# Patient Record
Sex: MALE | Race: WHITE | NOT HISPANIC OR LATINO | Employment: UNEMPLOYED | URBAN - METROPOLITAN AREA
[De-identification: names, ages, dates, MRNs, and addresses within clinical notes are randomized per-mention and may not be internally consistent; named-entity substitution may affect disease eponyms.]

---

## 2017-03-30 ENCOUNTER — HOSPITAL ENCOUNTER (EMERGENCY)
Facility: HOSPITAL | Age: 32
Discharge: HOME | End: 2017-03-31
Attending: EMERGENCY MEDICINE
Payer: MEDICAID

## 2017-03-30 DIAGNOSIS — E87.1 HYPONATREMIA: Primary | ICD-10-CM

## 2017-03-30 DIAGNOSIS — F10.20 ALCOHOLISM: ICD-10-CM

## 2017-03-30 LAB — ETHANOL SERPL-MCNC: 239 MG/DL

## 2017-03-30 PROCEDURE — 96360 HYDRATION IV INFUSION INIT: CPT

## 2017-03-30 PROCEDURE — 80053 COMPREHEN METABOLIC PANEL: CPT

## 2017-03-30 PROCEDURE — 85025 COMPLETE CBC W/AUTO DIFF WBC: CPT

## 2017-03-30 PROCEDURE — 25000003 PHARM REV CODE 250: Performed by: EMERGENCY MEDICINE

## 2017-03-30 PROCEDURE — 84443 ASSAY THYROID STIM HORMONE: CPT

## 2017-03-30 PROCEDURE — 99283 EMERGENCY DEPT VISIT LOW MDM: CPT | Mod: 25

## 2017-03-30 PROCEDURE — 80320 DRUG SCREEN QUANTALCOHOLS: CPT

## 2017-03-30 RX ORDER — THIAMINE HCL 100 MG
100 TABLET ORAL
Status: COMPLETED | OUTPATIENT
Start: 2017-03-30 | End: 2017-03-30

## 2017-03-30 RX ORDER — CITALOPRAM 20 MG/1
20 TABLET, FILM COATED ORAL DAILY
COMMUNITY

## 2017-03-30 RX ORDER — FOLIC ACID 1 MG/1
2 TABLET ORAL
Status: COMPLETED | OUTPATIENT
Start: 2017-03-30 | End: 2017-03-30

## 2017-03-30 RX ORDER — LISINOPRIL 20 MG/1
20 TABLET ORAL DAILY
COMMUNITY

## 2017-03-30 RX ADMIN — FOLIC ACID 2 MG: 1 TABLET ORAL at 11:03

## 2017-03-30 RX ADMIN — Medication 100 MG: at 11:03

## 2017-03-30 NOTE — ED AVS SNAPSHOT
[facility]  [address]  [facility] [address]               Simón Lowe JrKervin   3/30/2017 10:30 PM   ED    Description:  Male : 1985   Department:  [facility]           Your Care was Coordinated By:     Provider Role From To    Iglesia Kincaid MD Attending Provider [id number] --      Reason for Visit     Fatigue           Diagnoses this Visit        Comments    Hyponatremia    -  Primary     Alcoholism           ED Disposition     None           To Do List           Follow-up Information     Call [facility].    Specialty:  Family Medicine    Why:  Call to make an appointment with your regular doctor if you don't have one at this time.  Otherwise, follow up with your regular doctor in the next week for repeat blood work.  Your sodium level today is 130 and is from drinking too much beer.     Contact information:    [address], [address]  [facility] [address] [phone]        Follow up with [facility].    Specialties:  Behavioral Health, Psychiatric Facility    Why:  Call to schedule an appointment for inpatient or outpatient alcohol detoxification.     Contact information:    [address]  [phone]        [facility]     [facility] -  Assistance  Unless otherwise directed by your provider, please contact [facility], our nurse care line that is available for  assistance.     Registered nurses in the [facility] provide: appointment scheduling, clinical advisement, health education, and other advisory services.  Call: [phone] (toll free)               Medications           Message regarding Medications     Verify the changes and/or additions to your medication regime listed below are the same as discussed with your clinician today.  If any of these changes or additions are incorrect,  "please notify your healthcare provider.        These medications were administered today        Dose Freq    thiamine tablet 100 mg 100 mg ED 1 Time    Sig: Take 1 tablet (100 mg total) by mouth ED 1 Time.    Class: Normal    Route: Oral    folic acid tablet 2 mg 2 mg ED 1 Time    Sig: Take 2 tablets (2 mg total) by mouth ED 1 Time.    Class: Normal    Route: Oral    sodium chloride 0.9% bolus 1,000 mL 1,000 mL ED 1 Time    Sig: Inject 1,000 mLs into the vein ED 1 Time.    Class: Normal    Route: Intravenous           Verify that the below list of medications is an accurate representation of the medications you are currently taking.  If none reported, the list may be blank. If incorrect, please contact your healthcare provider. Carry this list with you in case of emergency.           Current Medications     citalopram (CELEXA) 20 MG tablet Take 20 mg by mouth once daily.    levetiracetam (KEPPRA) 1000 MG tablet Take 1 tablet (1,000 mg total) by mouth 2 (two) times daily.    lisinopril (PRINIVIL,ZESTRIL) 20 MG tablet Take 20 mg by mouth once daily.    hydrocodone-acetaminophen 5-325mg (NORCO) 5-325 mg per tablet Take 1 tablet by mouth every 4 (four) hours as needed for Pain.    sodium chloride 0.9% bolus 1,000 mL Inject 1,000 mLs into the vein ED 1 Time.           Clinical Reference Information           Your Vitals Were     BP Pulse Temp Resp Height Weight    130/87 (BP Location: Right arm, Patient Position: Sitting) 105 98 °F (36.7 °C) (Oral) 20 5' 6" (1.676 m) 75.8 kg (167 lb)    SpO2 BMI             98% 26.95 kg/m2         Allergies as of 3/31/2017     No Known Allergies      Immunizations Administered on Date of Encounter - 3/31/2017     None      ED Micro, Lab, POCT     Start Ordered       Status Ordering Provider    03/30/17 2303 03/30/17 2302  Ethanol  STAT      Final result     03/30/17 2300 03/30/17 2259  Levetiracetam level  STAT      In process     03/30/17 2256 03/30/17 2256  CBC auto differential  STAT  "     In process     03/30/17 2256 03/30/17 2256  Comprehensive metabolic panel  STAT      Final result     03/30/17 2256 03/30/17 2256  TSH  STAT      In process       ED Imaging Orders     None      Discharge References/Attachments     ALCOHOLISM, THE IMPACT OF (ENGLISH)    ALCOHOLISM, UNDERSTANDING (ENGLISH)    ALCOHOLISM: GETTING HELP (ENGLISH)    ALCOHOLISM: HOW TO BE PART OF THE SOLUTION (ENGLISH)    ALCOHOLISM: RESOURCES FOR FAMILY AND FRIENDS (ENGLISH)    HYPONATREMIA, DISCHARGE INSTRUCTIONS (ENGLISH)    HYPONATREMIA (ENGLISH)      [facility] Sign-Up     Activating your [facility] account is as easy as 1-2-3!     1) Visit [email or web address], select Sign Up Now, enter this activation code and your date of birth, then select Next.  [email or web address]  Expires: 5/15/2017 12:17 AM      2) Create a username and password to use when you visit [facility] in the future and select a security question in case you lose your password and select Next.    3) Enter your e-mail address and click Sign Up!    Additional Information  If you have questions, please e-mail [email or web address] or call [phone] to talk to our [facility] staff. Remember, [facility] is NOT to be used for urgent needs. For medical emergencies, dial 911.         Smoking Cessation     If you would like to quit smoking:   You may be eligible for free services if you are a [place] resident and started smoking cigarettes before September 1, 1988.  Call the Smoking Cessation Trust ([facility]) toll free at (457) 666-2238 or (747) 208-6370.   Call 9-800-QUIT-NOW if you do not meet the above criteria.   Contact us via email: [email or web address]   View our website for more information: [email or web address]         [facility] complies with applicable Federal civil rights laws and does not discriminate on the basis of race, color, national origin, age, disability, or sex.        Language Assistance Services     ATTENTION: Language  assistance services are available, free of charge. Please call [phone].      ATENCIÓN: Si habla español, tiene a nguyen disposición servicios gratuitos de asistencia lingüística. Llame al [phone].     CHÚ Ý: N?u b?n nói Ti?ng Vi?t, có các d?ch v? h? tr? ngôn ng? mi?n phí dành cho b?n. G?i s? [phone].

## 2017-03-31 VITALS
SYSTOLIC BLOOD PRESSURE: 130 MMHG | BODY MASS INDEX: 26.84 KG/M2 | HEIGHT: 66 IN | OXYGEN SATURATION: 98 % | RESPIRATION RATE: 18 BRPM | TEMPERATURE: 98 F | HEART RATE: 80 BPM | WEIGHT: 167 LBS | DIASTOLIC BLOOD PRESSURE: 67 MMHG

## 2017-03-31 LAB
ALBUMIN SERPL BCP-MCNC: 4.3 G/DL
ALP SERPL-CCNC: 55 U/L
ALT SERPL W/O P-5'-P-CCNC: 23 U/L
ANION GAP SERPL CALC-SCNC: 15 MMOL/L
AST SERPL-CCNC: 24 U/L
BASOPHILS # BLD AUTO: 0.04 K/UL
BASOPHILS NFR BLD: 0.6 %
BILIRUB SERPL-MCNC: 0.3 MG/DL
BUN SERPL-MCNC: 5 MG/DL
CALCIUM SERPL-MCNC: 8.9 MG/DL
CHLORIDE SERPL-SCNC: 97 MMOL/L
CO2 SERPL-SCNC: 18 MMOL/L
CREAT SERPL-MCNC: 0.7 MG/DL
DIFFERENTIAL METHOD: ABNORMAL
EOSINOPHIL # BLD AUTO: 0.2 K/UL
EOSINOPHIL NFR BLD: 3.4 %
ERYTHROCYTE [DISTWIDTH] IN BLOOD BY AUTOMATED COUNT: 13.4 %
EST. GFR  (AFRICAN AMERICAN): >60 ML/MIN/1.73 M^2
EST. GFR  (NON AFRICAN AMERICAN): >60 ML/MIN/1.73 M^2
GLUCOSE SERPL-MCNC: 87 MG/DL
HCT VFR BLD AUTO: 42.6 %
HGB BLD-MCNC: 14.9 G/DL
LYMPHOCYTES # BLD AUTO: 2.5 K/UL
LYMPHOCYTES NFR BLD: 36.8 %
MCH RBC QN AUTO: 33.5 PG
MCHC RBC AUTO-ENTMCNC: 35 %
MCV RBC AUTO: 96 FL
MONOCYTES # BLD AUTO: 0.9 K/UL
MONOCYTES NFR BLD: 12.7 %
NEUTROPHILS # BLD AUTO: 3.1 K/UL
NEUTROPHILS NFR BLD: 46.4 %
PLATELET # BLD AUTO: 274 K/UL
PMV BLD AUTO: 9.5 FL
POTASSIUM SERPL-SCNC: 4.4 MMOL/L
PROT SERPL-MCNC: 7.8 G/DL
RBC # BLD AUTO: 4.45 M/UL
SODIUM SERPL-SCNC: 130 MMOL/L
TSH SERPL DL<=0.005 MIU/L-ACNC: 1.42 UIU/ML
WBC # BLD AUTO: 6.69 K/UL

## 2017-03-31 PROCEDURE — 25000003 PHARM REV CODE 250: Performed by: EMERGENCY MEDICINE

## 2017-03-31 RX ADMIN — SODIUM CHLORIDE 1000 ML: 0.9 INJECTION, SOLUTION INTRAVENOUS at 12:03

## 2017-03-31 NOTE — ED TRIAGE NOTES
Pt. To room 6 with c/o generalized weakness for 2 days, pt. Is awake, alert and oriented. Skin PWD. Pt. Reports of having a history of HTN and seizures. Pt. smokes a pack of cigarettes a day. He is compliant with his home BP and seizure medications, he hasn't taken his vitamins in a few months. Spouse at bedside. Pt. ETOH

## 2017-03-31 NOTE — ED PROVIDER NOTES
Encounter Date: 3/30/2017       History     Chief Complaint   Patient presents with    Fatigue     reports extreme fatigue for 2 days      Review of patient's allergies indicates:  No Known Allergies  HPI Comments: Patient is a 31-year-old male with a past medical history of hypertension and alcohol withdrawal seizures who presents to emergency room for evaluation of extreme fatigue ×2 days.  The patient states he feels slightly dizzy when he stands up and walks around.  He denies any significant headache diplopia blurred vision slurred speech unilateral focal weakness or numbness trauma black or bloody stools abdominal pain polyuria or polydipsia.  Patient drinks about 10-15 beers a day for the past year and a half and prior to that drink or liquor.  He doesn't have a job.  He's never been to rehabilitation.  He states he's missed his dose of Celexa for the past 3 days for his anxiety but has been compliant with his Keppra and his lisinopril.  He denies any significant depression, but would like to stop drinking at some point.  He admits to drinking 10-12 beers tonight.  He typically has 10-15 beers a day.     Past Medical History:   Diagnosis Date    Hypertension     Seizures      History reviewed. No pertinent surgical history.  History reviewed. No pertinent family history.  Social History   Substance Use Topics    Smoking status: Smoker, Current Status Unknown     Packs/day: 1.00     Types: Cigarettes    Smokeless tobacco: Never Used    Alcohol use 4.8 oz/week     8 Cans of beer per week      Comment: 8 beers daily     Review of Systems   Constitutional: Positive for fatigue. Negative for chills, diaphoresis and fever.   HENT: Negative for ear pain, rhinorrhea and sore throat.    Eyes: Negative for pain and visual disturbance.   Respiratory: Negative for cough and shortness of breath.    Cardiovascular: Negative for chest pain.   Gastrointestinal: Negative for abdominal pain, diarrhea, nausea and  vomiting.   Genitourinary: Negative for decreased urine volume, difficulty urinating, dysuria and flank pain.   Musculoskeletal: Negative for arthralgias, back pain and myalgias.   Skin: Negative for color change, pallor and rash.   Neurological: Positive for dizziness and light-headedness. Negative for tremors, syncope, weakness, numbness and headaches.   Psychiatric/Behavioral: Negative for agitation, confusion and suicidal ideas. The patient is not nervous/anxious.    All other systems reviewed and are negative.      Physical Exam   Initial Vitals   BP Pulse Resp Temp SpO2   03/30/17 2225 03/30/17 2225 03/30/17 2225 03/30/17 2225 03/30/17 2225   130/87 105 20 98 °F (36.7 °C) 98 %     Physical Exam    Nursing note and vitals reviewed.  Constitutional: He appears well-developed and well-nourished. No distress.   Smells of EtOH     HENT:   Head: Normocephalic and atraumatic.   Mouth/Throat: Oropharynx is clear and moist.   Eyes: Conjunctivae and EOM are normal. Pupils are equal, round, and reactive to light.   Mild bilateral horizontal nystagmus   Neck: Normal range of motion. Neck supple. No JVD present.   Cardiovascular: Normal rate, regular rhythm, normal heart sounds and intact distal pulses. Exam reveals no gallop and no friction rub.    No murmur heard.  Pulmonary/Chest: Breath sounds normal. He has no wheezes. He has no rales.   Abdominal: Soft. Bowel sounds are normal. There is no tenderness.   Musculoskeletal: Normal range of motion. He exhibits no tenderness.   Neurological: He is alert and oriented to person, place, and time. He has normal strength. No sensory deficit.   Finger to nose and heel to shin surprisingly are intact.  He doesn't appear to have an abnormal gait.   Skin: Skin is warm and dry.   Psychiatric: He has a normal mood and affect.   Smells of EtOH but doesn't appear to be significantly intoxicated.         ED Course   Procedures  Labs Reviewed   CBC W/ AUTO DIFFERENTIAL   COMPREHENSIVE  METABOLIC PANEL   TSH             Medical Decision Making:   Patient likely has be reported jen with hyponatremia.  He was given thiamine folate and 1 normal saline bolus.  I don't think his condition develop osmotic demyelinating syndrome with a sodium of 1:30.  I've instructed him to call [facility] to begin outpatient rehabilitation and feel that he is stable for discharge.                   ED Course     Clinical Impression:   The primary encounter diagnosis was Hyponatremia. A diagnosis of Alcoholism was also pertinent to this visit.          Iglesia Kincaid MD  03/31/17 0019

## 2017-08-28 ENCOUNTER — HOSPITAL ENCOUNTER (EMERGENCY)
Facility: HOSPITAL | Age: 32
Discharge: HOME | End: 2017-08-28
Attending: EMERGENCY MEDICINE
Payer: MEDICAID

## 2017-08-28 VITALS
HEIGHT: 67 IN | OXYGEN SATURATION: 98 % | RESPIRATION RATE: 18 BRPM | TEMPERATURE: 98 F | WEIGHT: 142 LBS | HEART RATE: 100 BPM | SYSTOLIC BLOOD PRESSURE: 113 MMHG | DIASTOLIC BLOOD PRESSURE: 80 MMHG | BODY MASS INDEX: 22.29 KG/M2

## 2017-08-28 DIAGNOSIS — F10.20 ALCOHOLISM: Primary | ICD-10-CM

## 2017-08-28 LAB
ALBUMIN SERPL BCP-MCNC: 4 G/DL
ALP SERPL-CCNC: 65 U/L
ALT SERPL W/O P-5'-P-CCNC: 44 U/L
AMPHET+METHAMPHET UR QL: NEGATIVE
ANION GAP SERPL CALC-SCNC: 12 MMOL/L
AST SERPL-CCNC: 46 U/L
BARBITURATES UR QL SCN>200 NG/ML: NEGATIVE
BASOPHILS # BLD AUTO: 0.03 K/UL
BASOPHILS NFR BLD: 0.6 %
BENZODIAZ UR QL SCN>200 NG/ML: NEGATIVE
BILIRUB SERPL-MCNC: 0.2 MG/DL
BILIRUB UR QL STRIP: NEGATIVE
BUN SERPL-MCNC: 4 MG/DL
BZE UR QL SCN: NEGATIVE
CALCIUM SERPL-MCNC: 8.9 MG/DL
CANNABINOIDS UR QL SCN: NEGATIVE
CHLORIDE SERPL-SCNC: 105 MMOL/L
CLARITY UR: CLEAR
CO2 SERPL-SCNC: 25 MMOL/L
COLOR UR: YELLOW
CREAT SERPL-MCNC: 0.8 MG/DL
CREAT UR-MCNC: 8.4 MG/DL
DIFFERENTIAL METHOD: ABNORMAL
EOSINOPHIL # BLD AUTO: 0.1 K/UL
EOSINOPHIL NFR BLD: 2.1 %
ERYTHROCYTE [DISTWIDTH] IN BLOOD BY AUTOMATED COUNT: 13.7 %
EST. GFR  (AFRICAN AMERICAN): >60 ML/MIN/1.73 M^2
EST. GFR  (NON AFRICAN AMERICAN): >60 ML/MIN/1.73 M^2
ETHANOL SERPL-MCNC: 251 MG/DL
GLUCOSE SERPL-MCNC: 113 MG/DL
GLUCOSE UR QL STRIP: ABNORMAL
HCT VFR BLD AUTO: 41.6 %
HGB BLD-MCNC: 14.7 G/DL
HGB UR QL STRIP: NEGATIVE
KETONES UR QL STRIP: NEGATIVE
LEUKOCYTE ESTERASE UR QL STRIP: NEGATIVE
LYMPHOCYTES # BLD AUTO: 2.6 K/UL
LYMPHOCYTES NFR BLD: 50 %
MCH RBC QN AUTO: 33.6 PG
MCHC RBC AUTO-ENTMCNC: 35.3 G/DL
MCV RBC AUTO: 95 FL
METHADONE UR QL SCN>300 NG/ML: NEGATIVE
MONOCYTES # BLD AUTO: 0.8 K/UL
MONOCYTES NFR BLD: 14.3 %
NEUTROPHILS # BLD AUTO: 1.7 K/UL
NEUTROPHILS NFR BLD: 33 %
NITRITE UR QL STRIP: NEGATIVE
OPIATES UR QL SCN: NEGATIVE
PCP UR QL SCN>25 NG/ML: NEGATIVE
PH UR STRIP: 5 [PH] (ref 5–8)
PLATELET # BLD AUTO: 270 K/UL
PMV BLD AUTO: 8.6 FL
POTASSIUM SERPL-SCNC: 4.2 MMOL/L
PROT SERPL-MCNC: 7.6 G/DL
PROT UR QL STRIP: NEGATIVE
RBC # BLD AUTO: 4.37 M/UL
SODIUM SERPL-SCNC: 142 MMOL/L
SP GR UR STRIP: <=1.005 (ref 1–1.03)
TOXICOLOGY INFORMATION: ABNORMAL
URN SPEC COLLECT METH UR: ABNORMAL
UROBILINOGEN UR STRIP-ACNC: NEGATIVE EU/DL
WBC # BLD AUTO: 5.26 K/UL

## 2017-08-28 PROCEDURE — 99284 EMERGENCY DEPT VISIT MOD MDM: CPT

## 2017-08-28 PROCEDURE — 81003 URINALYSIS AUTO W/O SCOPE: CPT

## 2017-08-28 PROCEDURE — 80053 COMPREHEN METABOLIC PANEL: CPT

## 2017-08-28 PROCEDURE — 93005 ELECTROCARDIOGRAM TRACING: CPT

## 2017-08-28 PROCEDURE — 85025 COMPLETE CBC W/AUTO DIFF WBC: CPT

## 2017-08-28 PROCEDURE — 25000003 PHARM REV CODE 250: Performed by: PSYCHIATRY & NEUROLOGY

## 2017-08-28 PROCEDURE — 80307 DRUG TEST PRSMV CHEM ANLYZR: CPT

## 2017-08-28 PROCEDURE — 93010 ELECTROCARDIOGRAM REPORT: CPT | Mod: ,,, | Performed by: INTERNAL MEDICINE

## 2017-08-28 PROCEDURE — 80320 DRUG SCREEN QUANTALCOHOLS: CPT

## 2017-08-28 PROCEDURE — 25000003 PHARM REV CODE 250: Performed by: EMERGENCY MEDICINE

## 2017-08-28 RX ORDER — CHLORDIAZEPOXIDE HYDROCHLORIDE 25 MG/1
50 CAPSULE, GELATIN COATED ORAL
Status: COMPLETED | OUTPATIENT
Start: 2017-08-28 | End: 2017-08-28

## 2017-08-28 RX ORDER — THIAMINE HCL 100 MG
100 TABLET ORAL DAILY
Status: DISCONTINUED | OUTPATIENT
Start: 2017-08-28 | End: 2017-08-28 | Stop reason: HOSPADM

## 2017-08-28 RX ORDER — CHLORDIAZEPOXIDE HYDROCHLORIDE 25 MG/1
50 CAPSULE, GELATIN COATED ORAL 4 TIMES DAILY
Status: DISCONTINUED | OUTPATIENT
Start: 2017-08-28 | End: 2017-08-28 | Stop reason: HOSPADM

## 2017-08-28 RX ADMIN — Medication 1 TABLET: at 10:08

## 2017-08-28 RX ADMIN — CHLORDIAZEPOXIDE HYDROCHLORIDE 50 MG: 25 CAPSULE ORAL at 06:08

## 2017-08-28 RX ADMIN — Medication 100 MG: at 10:08

## 2017-08-28 RX ADMIN — CHLORDIAZEPOXIDE HYDROCHLORIDE 50 MG: 25 CAPSULE ORAL at 10:08

## 2017-08-28 NOTE — ED PROVIDER NOTES
Encounter Date: 8/28/2017       History     Chief Complaint   Patient presents with    Alcohol Problem     pt. is alcoholic and attempted to admit himself to [facility] for detox. pt. was sent here for medical clearance. pt. states he drank 13 beers today. average intake 18 beers per day.      31-year-old male long-standing alcoholic who presents to emergency department wanting detox.  He called [facility] and they told them he need to go to the ER for medical clearance.  The patient drinks approximately 12 beers to 18 beers a day.  His last beer was at midnight.  Patient states he wants to stop drinking because he hasn't worked in 2 years and and he would like to do it for his family and his wife.  At one point he did have a seizure several months ago, but denies any history of DTs.  He's been drinking daily for 5 years.  Currently he denies any headaches tremorsor any other complaints.          Review of patient's allergies indicates:  No Known Allergies  Past Medical History:   Diagnosis Date    Hypertension     Seizures      No past surgical history on file.  No family history on file.  Social History   Substance Use Topics    Smoking status: Smoker, Current Status Unknown     Packs/day: 1.00     Types: Cigarettes    Smokeless tobacco: Never Used    Alcohol use 4.8 oz/week     8 Cans of beer per week      Comment: 8 beers daily     Review of Systems   Constitutional: Negative for fever.   HENT: Negative for ear pain, rhinorrhea and sore throat.    Eyes: Negative for pain and visual disturbance.   Respiratory: Negative for cough and shortness of breath.    Cardiovascular: Negative for chest pain.   Gastrointestinal: Negative for abdominal pain, diarrhea, nausea and vomiting.   Genitourinary: Negative for difficulty urinating.   Musculoskeletal: Negative for arthralgias.   Skin: Negative for rash.   Neurological: Negative for weakness, numbness and headaches.   Psychiatric/Behavioral: Negative for agitation  and confusion.        Depressed but not suicidal   All other systems reviewed and are negative.      Physical Exam     Initial Vitals [08/28/17 0239]   BP Pulse Resp Temp SpO2   (!) 135/95 94 20 98.5 °F (36.9 °C) 97 %      MAP       108.33         Physical Exam    Nursing note and vitals reviewed.  Constitutional: He appears well-developed and well-nourished. No distress.   HENT:   Head: Normocephalic and atraumatic.   Right Ear: External ear normal.   Left Ear: External ear normal.   Nose: Nose normal.   Mouth/Throat: Oropharynx is clear and moist.   Eyes: Conjunctivae and EOM are normal. Pupils are equal, round, and reactive to light.   Neck: Normal range of motion. Neck supple.   Cardiovascular: Normal rate, regular rhythm, normal heart sounds and intact distal pulses. Exam reveals no gallop and no friction rub.    No murmur heard.  Pulmonary/Chest: Breath sounds normal. He has no wheezes. He has no rhonchi. He has no rales.   Abdominal: Soft. Bowel sounds are normal. There is no tenderness.   Musculoskeletal: Normal range of motion.   Neurological: He is alert and oriented to person, place, and time. He has normal strength. No sensory deficit.   Patient does not appear to be intoxicated and does not have any slurred speech.   Skin: Skin is warm and dry.   Psychiatric: He has a normal mood and affect.   Patient is not suicidal or homicidal.  He states he is not really depressed, but just wants to quit drinking.         ED Course   Procedures  Labs Reviewed   ALCOHOL,MEDICAL (ETHANOL) - Abnormal; Notable for the following:        Result Value    Alcohol, Medical, Serum 251 (*)     All other components within normal limits   DRUG SCREEN PANEL, URINE EMERGENCY - Abnormal; Notable for the following:     Creatinine, Random Ur 8.4 (*)     All other components within normal limits   COMPREHENSIVE METABOLIC PANEL - Abnormal; Notable for the following:     Glucose 113 (*)     BUN, Bld 4 (*)     AST 46 (*)     All other  components within normal limits   CBC W/ AUTO DIFFERENTIAL - Abnormal; Notable for the following:     RBC 4.37 (*)     MCH 33.6 (*)     MPV 8.6 (*)     Gran # 1.7 (*)     Gran% 33.0 (*)     Lymph% 50.0 (*)     All other components within normal limits   URINALYSIS             Medical Decision Making:   The patient is an alcoholic and would like to quit drinking.  He is not depressed and suicidal and does not meet PEC criteria.  He came here to get medically cleared at the request of [facility].  He has no signs of DTs here.  He does appear sober with an alcohol level of 251.  We will try to place the patient at a local hospital for formal voluntary admission.  I will give him a dose of Librium here now.  Medically cleared except his alcohol level is elevated at 251.  He does not appear to be clinically intoxicated and I suspect this is because of his chronic alcoholism.                   ED Course     Clinical Impression:   The encounter diagnosis was Alcoholism.                           Iglesia Kincaid MD  08/28/17 0674

## 2017-08-28 NOTE — ED NOTES
Spoke with admissions at [facility] will take pt with 200 etoh as long as he can stand up. Wife will drive pt to facility.  Patient  Awake andlert and oriented x 3, steady gait  Noted.

## 2017-08-28 NOTE — ED TRIAGE NOTES
Pt presents to ED with c/o needing alcohol detox after drinking 13 beers throughout the day. Pt states that he drinks an average of 18 beers a day and wants help to fix his drinking problem. Pt states that he attempted to go to [facility] for voluntary admission but was told that he needed to be medically cleared prior to being admitted. Pt is awake, alert and oriented. Significant other at bedside.

## 2017-08-28 NOTE — PROVIDER PROGRESS NOTES - EMERGENCY DEPT.
Encounter Date: 8/28/2017    ED Physician Progress Notes        Physician Note:   Case accepted from Dr. Kincaid at 7am pending psych eval by Dr. Wise, sobriety and disposition. Pt is a voluntary ETOH dependence patient who seeks to get clean. He is neuro intact (no tremors), and lungs, cardio exam is WNL. Pt denies any auditory hallucinations/sensitivity, formication, or anxiety at this time.    Dr. Wise states that patient can go to his facility, however the patient prefers [facility].     [facility] was contacted, who will accept the patient with EtOH well as it is.  Patient is clinically sober and stable for discharge at this time.  Patient will go voluntarily to [facility], who states they have a bed for him.     IMPRESSION:  1.Alcoholism  (primary encounter diagnosis)

## 2017-08-29 NOTE — PSYCH
"IDENTIFICATION DATA:  This is a 31-year-old single white male who was brought to   ER by significant other for alcohol intoxication and depression.  This consult   is requested by Dr. Kincaid for psych evaluation.  The patient is not on a PEC   status.    CHIEF COMPLAINT:  "I want to detox and able to function."    HISTORY OF PRESENTING ILLNESS:  According to ER notes, the patient came here to   get into [facility] and after medical clearance, the patient was drinking   alcohol.  The patient states that he has been drinking since his teens.  He is a   daily drinker and his sobriety time is not more than a few days.  He drinks   about 12 to 18 beers every day.  He gets shakes and has to drink in the morning   and able to function and avoid withdrawals.  He gets sweating, nausea and he has   a history of withdrawal seizures, but no DTs.  The patient never had DWIs.  The   patient states that it is affecting home life and motivation.  The patient   states that he does not feel depressed, but he was told that he has depression.    The patient states at times he feels hopeless and helpless.  He has no   motivation and low energy.  He is not eating properly and has no appetite.  He   admitted that he lost some weight.  He looks sad, but denies any sad feeling.    The patient denies use of other drugs.  He is not hearing voices or seeing   things.  He has no intention to harm self.  He has had no job for two years,   partly because of no motivation, staying at home and drinking.  It is affecting   his home life and his obligations.  He has trouble falling asleep and sometimes   he has to drink to fall asleep.    PAST PSYCHIATRIC HISTORY:  The patient has no previous in or outpatient   psychiatric treatment.  He was at [facility] for seizure activity two   years ago and they started him on antidepressants, which he did not take.  The   patient denies a history of suicide or homicide.  He has never been in rehab "   programs or meetings.  He has no history of arrest.    SOCIAL HISTORY:  The patient was born and raised in [place].  He   described his childhood as raised by mother and grandmother.  He has an 11th   grade education.  He was raised by mom, parents were  when he was born.    He has one seven-year-old daughter who lives with her mother.  His grandmother   and dad were alcoholics, but no suicide in family.    MEDICAL HISTORY:  The patient was diagnosed with seizure probably alcohol   withdrawal seizures and started on Keppra 1000.  He has hypertension and takes   lisinopril.  He has a history of GI bleed.  He was prescribed Celexa 20.    ALLERGIES:  He is not allergic to any medicine or food.    MEDICATIONS:  He is on lisinopril, there is some concern about question about   compliance issue.  The patient takes Keppra.  His CBC and chemistry are normal,   bilirubin is 0.2, AST 46, ALT 44.  Urine drug screen is negative.  His alcohol   level was 251.  Blood pressure is 135/95, with 94 pulse.  Librium 50 mg four   times a day, thiamine, multivitamins.    MENTAL STATUS EXAMINATION:  This is a 31-year-old healthy-looking white male who   is flushed, exhibited good eye contact.  He is alert, cooperative and oriented   to day, date, month and year.  Mood is depressed with sad affect.  Psychomotor   activity is decreased.  His speech is soft, clear, normal in amount, rate and   tone.  No loose association or disorganized thoughts noted.  Mood is depressed   with sad affect.  Psychomotor activity is decreased.  No tremors noted; however,   he still has flushed face.  He is able to recall 3 objects out of 3   immediately, 2 out of 3 after 5 minutes and 3 out of 3 with some assistance.  He   denies hearing voices or seeing things.  He has no intention to harm self or   others.  Insight and judgment are impaired.  He is an average intelligent   person.    PSYCHIATRIC DIAGNOSES:  AXIS I:  Depressive disorder,  not otherwise specified, alcohol use disorder,   alcohol intoxication severe without perceptual disturbance.  AXIS II:  No diagnosis.  AXIS III:  Hypertension, seizure disorder.  AXIS IV:  Medical problems, unemployed, unable to quit.  AXIS V:  35.    RECOMMENDATIONS:  We will start this patient on Librium 50 mg 4 times a day x1   day, then 25 mg 4 times a day.  We will use thiamine, multivitamins as per   order.  We will try [facility] for his transfer, otherwise, we will admit this   patient to [facility].  In [facility], his initial   diagnostic workup will also include nursing assessment,    assessment, laboratory workup and physical examination.  We will detox and   consider the option of Antabuse.  We will educate about medication, illness and   alcohol and relapse prevention techniques.    PROGNOSIS:  Fair.    ESTIMATED LENGTH OF STAY IN HOSPITAL: Would be 3 days.    CRITERIA FOR DISCHARGE:  The patient will show improvement in depression and   safe detox.    ASSETS:  The patient is verbal, medically stable and has a place to live.            / [id number] macario(s)       ODALIS  dd: 08/28/2017 10:17:26 (CDT)  td: 08/28/2017 22:06:52 (CDT)  Doc ID   #[id number]  Job ID #[id number]    CC: [facility]

## 2017-11-17 ENCOUNTER — HOSPITAL ENCOUNTER (EMERGENCY)
Facility: HOSPITAL | Age: 32
Discharge: HOME | End: 2017-11-17
Attending: EMERGENCY MEDICINE
Payer: MEDICAID

## 2017-11-17 VITALS
HEIGHT: 68 IN | HEART RATE: 90 BPM | RESPIRATION RATE: 18 BRPM | TEMPERATURE: 98 F | SYSTOLIC BLOOD PRESSURE: 123 MMHG | OXYGEN SATURATION: 98 % | BODY MASS INDEX: 25.91 KG/M2 | WEIGHT: 171 LBS | DIASTOLIC BLOOD PRESSURE: 84 MMHG

## 2017-11-17 DIAGNOSIS — R20.2 PARESTHESIA OF LEFT ARM: Primary | ICD-10-CM

## 2017-11-17 PROCEDURE — 99283 EMERGENCY DEPT VISIT LOW MDM: CPT

## 2017-11-18 NOTE — DISCHARGE INSTRUCTIONS
Return to the ED if condition changes, progresses, or if you have any concerns. Continue your medications as prescribed.

## 2017-11-18 NOTE — ED NOTES
Pt reports lt arm numbness woke him up this morning, pt reports sensation returned approx 1-2 minutes after waking up, pt denies recent injury or trauma, bilateral radial pulses + 2, no tremors noted, pt awake alert in no acute distress, pt denies chest pain or sob, full active rom noted, bilateral lower arm sensation intact, friend in room with pt,

## 2017-11-18 NOTE — ED PROVIDER NOTES
"Encounter Date: 11/17/2017       History     Chief Complaint   Patient presents with    Arm Pain     left upper arm pain in axillary area with weakness that has resolved, tingling sensation to left arm now, no limb drift or weakness noted, fine tremors noted     33yo male presents to the ED for evaluation of left arm "tingling."  Pt states that when he awoke today, he reports that his left arm was numb.  After moving his arm, the numbness slowly faded away.  He reports a "pins and needles" sensation.  He reports that his arm feels much improved, but is still "heavy."  He denies headache, weakness, trauma, fever/chills, neck pain, chest pain, shortness of breath, palpitations, and cough.  He is here for evaluation at the request of his mother and wife.  The patient is on Antabuse for alcohol withdrawal, and denies tremors and seizure like activity.      The history is provided by the patient.     Review of patient's allergies indicates:  No Known Allergies  Past Medical History:   Diagnosis Date    Hypertension     Seizures      History reviewed. No pertinent surgical history.  History reviewed. No pertinent family history.  Social History   Substance Use Topics    Smoking status: Smoker, Current Status Unknown     Packs/day: 1.00     Types: Cigarettes    Smokeless tobacco: Never Used    Alcohol use 4.8 oz/week     8 Cans of beer per week      Comment: 8 beers daily     Review of Systems   Constitutional: Negative for chills and fever.   HENT: Negative for congestion.    Respiratory: Negative for cough and shortness of breath.    Cardiovascular: Negative for chest pain and palpitations.   Gastrointestinal: Negative for abdominal pain, diarrhea, nausea and vomiting.   Musculoskeletal: Negative for arthralgias, back pain, neck pain and neck stiffness.   Skin: Negative for color change and rash.   Neurological: Negative for dizziness, tremors, seizures, speech difficulty, weakness, light-headedness, numbness and " "headaches.        Previous "pins and needles" tingling.  Arm feels heavy currently.    Hematological: Does not bruise/bleed easily.   Psychiatric/Behavioral: Negative for confusion.   All other systems reviewed and are negative.      Physical Exam     Initial Vitals [11/17/17 1806]   BP Pulse Resp Temp SpO2   (!) 143/84 105 18 98.4 °F (36.9 °C) 99 %      MAP       103.67         Physical Exam    Nursing note and vitals reviewed.  Constitutional: Vital signs are normal. He appears well-developed and well-nourished. He is active and cooperative. He is easily aroused.  Non-toxic appearance. He does not have a sickly appearance. He does not appear ill. No distress.   HENT:   Head: Normocephalic and atraumatic.   Eyes: Conjunctivae are normal.   Neck: Normal range of motion.   Cardiovascular: Normal rate, regular rhythm and normal heart sounds.   Pulses:       Radial pulses are 2+ on the right side, and 2+ on the left side.   Pulmonary/Chest: Effort normal and breath sounds normal.   Abdominal: Soft. Normal appearance and bowel sounds are normal. There is no tenderness.   Musculoskeletal:        Left shoulder: Normal.        Left elbow: Normal.        Left wrist: Normal.        Cervical back: Normal.        Left upper arm: Normal.        Left forearm: Normal.        Left hand: Normal.   Neurological: He is alert, oriented to person, place, and time and easily aroused. He has normal strength. He displays no atrophy and no tremor. No sensory deficit. He exhibits normal muscle tone. He displays no seizure activity. GCS eye subscore is 4. GCS verbal subscore is 5. GCS motor subscore is 6.   No tremor noted   Skin: Skin is warm, dry and intact. Capillary refill takes less than 2 seconds. No abrasion, no bruising, no ecchymosis and no rash noted. No cyanosis or erythema.   Psychiatric: He has a normal mood and affect. His speech is normal and behavior is normal. Judgment and thought content normal. Cognition and memory are " normal.         ED Course   Procedures  Labs Reviewed - No data to display          Medical Decision Making:   Initial Assessment:   32-year-old male here for left arm tingling when he awoke from sleeping.  The patient reports that the symptoms improved with movement.  The patient denies chest pain or shortness of breath.  The patient appears well, nontoxic.  Vital stable.  Full active range of motion of bilateral upper extremities.  Sensation and strength intact in bilateral upper extremities.  Capillary refill less than 3 seconds.  Radial pulses 2+ bilaterally by palpation.  Skin warm, dry, and intact without rash or cyanosis.  No tenderness to palpation or swelling.  No tremor.  Differential Diagnosis:   Paresthesias, cervical radiculopathy  ED Management:  Exam  There is no indication for labs or imaging at this time.  Pt's symptoms due to paresthesia.  No signs of DVT or CVA.  Pt has full sensation to BUE and full AROM.  Pt to follow up with PCP within 2 days.  I reviewed strict return precautions. In addition, pt is to return to the ED if condition changes, progresses, or if there are any concerns.  Pt verbalized understanding, compliance, and agreement with the treatment plan.                  Attending Attestation:     Physician Attestation Statement for NP/PA:       Other NP/PA Attestation Additions:      Medical Decision Making: I was available for consultation to the mid-level (NP/PA) during the course of the patients care.  I was not utilized or consulted regarding this patient.                    ED Course      Clinical Impression:   The encounter diagnosis was Paresthesia of left arm.    Disposition:   Disposition: Discharged  Condition: Stable                        GAYLA Vanegas  11/17/17 1918       Abril Walters MD  11/17/17 1932